# Patient Record
Sex: MALE | Employment: OTHER | URBAN - METROPOLITAN AREA
[De-identification: names, ages, dates, MRNs, and addresses within clinical notes are randomized per-mention and may not be internally consistent; named-entity substitution may affect disease eponyms.]

---

## 2018-09-10 ENCOUNTER — TELEPHONE (OUTPATIENT)
Dept: UROLOGY | Age: 63
End: 2018-09-10

## 2018-09-10 DIAGNOSIS — Z12.5 SCREENING FOR PROSTATE CANCER: Primary | ICD-10-CM

## 2018-11-27 ENCOUNTER — OFFICE VISIT (OUTPATIENT)
Dept: UROLOGY | Age: 63
End: 2018-11-27
Payer: COMMERCIAL

## 2018-11-27 VITALS
DIASTOLIC BLOOD PRESSURE: 86 MMHG | HEIGHT: 68 IN | WEIGHT: 206 LBS | SYSTOLIC BLOOD PRESSURE: 132 MMHG | BODY MASS INDEX: 31.22 KG/M2 | HEART RATE: 76 BPM | TEMPERATURE: 98.6 F

## 2018-11-27 DIAGNOSIS — R35.1 NOCTURIA: ICD-10-CM

## 2018-11-27 DIAGNOSIS — Z12.5 SCREENING FOR PROSTATE CANCER: ICD-10-CM

## 2018-11-27 DIAGNOSIS — N52.9 ERECTILE DYSFUNCTION, UNSPECIFIED ERECTILE DYSFUNCTION TYPE: Primary | ICD-10-CM

## 2018-11-27 DIAGNOSIS — R39.12 WEAK URINARY STREAM: ICD-10-CM

## 2018-11-27 PROCEDURE — 99213 OFFICE O/P EST LOW 20 MIN: CPT | Performed by: NURSE PRACTITIONER

## 2018-11-27 RX ORDER — ALENDRONATE SODIUM 70 MG/1
1 TABLET ORAL WEEKLY
Refills: 3 | COMMUNITY
Start: 2018-11-01

## 2018-11-27 RX ORDER — CHOLECALCIFEROL (VITAMIN D3) 125 MCG
500 CAPSULE ORAL DAILY
COMMUNITY

## 2018-11-27 RX ORDER — CHLORAL HYDRATE 500 MG
2000 CAPSULE ORAL DAILY
COMMUNITY

## 2018-11-27 RX ORDER — SILDENAFIL 100 MG/1
100 TABLET, FILM COATED ORAL PRN
Qty: 30 TABLET | Refills: 11 | Status: SHIPPED | OUTPATIENT
Start: 2018-11-27 | End: 2019-07-11 | Stop reason: SDUPTHER

## 2018-11-27 ASSESSMENT — ENCOUNTER SYMPTOMS
WHEEZING: 0
NAUSEA: 0
EYE REDNESS: 0
SHORTNESS OF BREATH: 0
COUGH: 0
VOMITING: 0
ABDOMINAL PAIN: 0
BACK PAIN: 0
EYE PAIN: 0
COLOR CHANGE: 0

## 2018-11-27 NOTE — LETTER
[facility]  [facility] UROLOGY SPECIALISTS - [place]  [address]  [address]  [address] [facility] [address]  Dept: [phone]  Dept Fax: [phone]        11/27/18    Patient: Silvio Menjivar  YOB: 1955    Dear Rell Burr MD,    I had the pleasure of seeing one of your patients, Gay Carlton today in the office today. Below are the relevant portions of my assessment and plan of care. IMPRESSION:     1. Weak urinary stream    2. Erectile dysfunction, unspecified erectile dysfunction type    3. Nocturia        PLAN:   PSA and EDWARD every 2 years as per  [facility]    Viagra 100 mg as needed. Bladder irritants: coffee, tea, carbonated, alcohol. Call with worsening urinary problems. No Follow-up on file. Prescriptions Ordered:  Orders Placed This Encounter   Medications    sildenafil (VIAGRA) 100 MG tablet     Sig: Take 1 tablet by mouth as needed for Erectile Dysfunction (sex)     Dispense:  30 tablet     Refill:  11     Orders Placed:  No orders of the defined types were placed in this encounter. Thank you for allowing me to participate in the care of this patient. I will keep you updated on this patient's follow up and I look forward to serving you and your patients again in the future.     JENNIFER Melgoza - CNP

## 2018-11-27 NOTE — PATIENT INSTRUCTIONS
PSA and EDWARD every 2 years as per Dr Tapan Dowling    Viagra 100 mg as needed. Bladder irritants: coffee, tea, carbonated, alcohol. Call with worsening urinary problems.

## 2018-11-27 NOTE — PROGRESS NOTES
Review of Systems   Constitutional: Negative for activity change, chills and fever. Eyes: Negative for pain, redness and visual disturbance. Respiratory: Negative for cough, shortness of breath and wheezing. Cardiovascular: Negative for chest pain and leg swelling. Gastrointestinal: Negative for abdominal pain, nausea and vomiting. Genitourinary: Negative for difficulty urinating, discharge, dysuria, flank pain, frequency, hematuria, scrotal swelling and testicular pain. Musculoskeletal: Negative for back pain, joint swelling and myalgias. Skin: Negative for color change and rash. Neurological: Negative for dizziness, tremors and numbness. Hematological: Negative for adenopathy. Does not bruise/bleed easily.
noted  HEENT: Head: Normocephalic and atraumatic  Conjunctivae and EOM are normal. Pupils are equal, round  Nose:Normal  Right External Ear: Normal; Left External Ear: Normal  Mouth: Mucosa Moist  Neck: Supple  Lungs: Respiratory effort is normal  Cardiovascular: Strong and regular, no edema. Abdomen: Soft, non-tender, non-distended  Bladder non-tender and not distended. Musculoskeletal: Normal gait and station  Prostate: enlarged, no nodule or induration. Assessment and Plan      1. Erectile dysfunction, unspecified erectile dysfunction type    2. Weak urinary stream    3. Nocturia    4. Screening for prostate cancer           Plan:    PSA and EDWARD every 2 years as per Dr Idalia Rubio. PSA is 1.20, PSA 2016- 1.4    Viagra 100 mg as needed. Bladder irritants: coffee, tea, carbonated, alcohol. Call with worsening urinary problems. Return in about 2 years (around 11/27/2020) for PSA  and EDWARD 2 yrs. Prescriptions Ordered:  Orders Placed This Encounter   Medications    sildenafil (VIAGRA) 100 MG tablet     Sig: Take 1 tablet by mouth as needed for Erectile Dysfunction (sex)     Dispense:  30 tablet     Refill:  11     Orders Placed:  No orders of the defined types were placed in this encounter. JENNIFER Acevedo CNP    Agree with the ROS entered by the MA.

## 2019-06-04 ENCOUNTER — OFFICE VISIT (OUTPATIENT)
Dept: UROLOGY | Age: 64
End: 2019-06-04
Payer: COMMERCIAL

## 2019-06-04 VITALS
HEART RATE: 91 BPM | TEMPERATURE: 98.3 F | BODY MASS INDEX: 30.49 KG/M2 | HEIGHT: 68 IN | SYSTOLIC BLOOD PRESSURE: 118 MMHG | WEIGHT: 201.2 LBS | DIASTOLIC BLOOD PRESSURE: 72 MMHG

## 2019-06-04 DIAGNOSIS — K62.89 RECTAL PAIN: ICD-10-CM

## 2019-06-04 DIAGNOSIS — R33.9 INCOMPLETE BLADDER EMPTYING: ICD-10-CM

## 2019-06-04 DIAGNOSIS — R35.1 NOCTURIA: ICD-10-CM

## 2019-06-04 DIAGNOSIS — R39.13 INTERMITTENT URINARY STREAM: ICD-10-CM

## 2019-06-04 DIAGNOSIS — R35.0 URINARY FREQUENCY: Primary | ICD-10-CM

## 2019-06-04 DIAGNOSIS — R39.12 WEAK URINARY STREAM: ICD-10-CM

## 2019-06-04 LAB
APPEARANCE FLUID: NORMAL
BILIRUBIN, POC: NORMAL
BLOOD URINE, POC: NORMAL
CLARITY, POC: CLEAR
COLOR, POC: YELLOW
GLUCOSE URINE, POC: NORMAL
KETONES, POC: NORMAL
LEUKOCYTE EST, POC: NORMAL
NITRITE, POC: NORMAL
PH, POC: NORMAL
PROTEIN, POC: NORMAL
SPECIFIC GRAVITY, POC: NORMAL
UROBILINOGEN, POC: NORMAL

## 2019-06-04 PROCEDURE — 81002 URINALYSIS NONAUTO W/O SCOPE: CPT | Performed by: NURSE PRACTITIONER

## 2019-06-04 PROCEDURE — 99214 OFFICE O/P EST MOD 30 MIN: CPT | Performed by: NURSE PRACTITIONER

## 2019-06-04 RX ORDER — TAMSULOSIN HYDROCHLORIDE 0.4 MG/1
0.4 CAPSULE ORAL DAILY
Qty: 30 CAPSULE | Refills: 1 | Status: SHIPPED | OUTPATIENT
Start: 2019-06-04 | End: 2019-07-11

## 2019-06-04 ASSESSMENT — ENCOUNTER SYMPTOMS
BACK PAIN: 0
NAUSEA: 0
EYE PAIN: 0
COUGH: 0
VOMITING: 0
SHORTNESS OF BREATH: 0
ABDOMINAL PAIN: 0
EYE REDNESS: 0
WHEEZING: 0
DIARRHEA: 0
CONSTIPATION: 0

## 2019-06-04 NOTE — PROGRESS NOTES
[facility]  [facility] UROLOGY SPECIALISTS - [place]  [address]  [address]  [address] [facility] [address]  Dept: [address] [facility] Urology Office Note - Established    Patient:  Guerline Ferguson  YOB: 1955  Date: 6/4/2019    The patient is a 58 y.o. male who presents todayfor evaluation of the following problems:   Chief Complaint   Patient presents with    Urinary Frequency     x 1.5 weeks    Groin Pain     x 1.5 weeks        HPI  Here for groin pain and rectal pain,  urinary frequency that started a few days ago. He started an old script of Cipro and has started in 2x per for the last 2 days. He used to have chronic prostatitis and his old Urologist gave his prn Cipro to start. He has noticed with Cipro that his groin pain and rectal pain is improving but continues with frequency, intermittency, incomplete emptying and nocturia. He has had no new partners.     Summary of old records: N/A    Additional History: N/A    Procedures Today: N/A    Urinalysis today:  Results for POC orders placed in visit on 06/04/19   POCT Urine Dipstick   Result Value Ref Range    Color, UA YELLOW     Clarity, UA CLEAR     Glucose, UA POC NEG     Bilirubin, UA      Ketones, UA      Spec Grav, UA      Blood, UA POC TRACE     pH, UA      Protein, UA POC TRACE     Urobilinogen, UA      Leukocytes, UA NEG     Nitrite, UA NEG     Appearance, Fluid  Clear, Slightly Cloudy     Last several PSA's:  No results found for: PSA  Last total testosterone:  No results found for: TESTOSTERONE    AUA Symptom Score (6/4/2019):  INCOMPLETE EMPTYING: How often have you had the sensation of not emptying your bladder?: Less than Half the time  FREQUENCY: How often do you have to urinate less than every two hours?: Not at all  INTERMITTENCY: How often have you found you stopped and started again several times when you urinated?: Not at all  URGENCY: How often have you found it difficult to postpone urination?: Not at all  WEAK STREAM: How often have you had a weak urinary stream?: Not at all  STRAINING: How often have you had to strain to start  urination?: Not at all  NOCTURIA: How many times did you typically get up at night to uriniate?: 2 Times  TOTAL I-PSS SCORE[de-identified] 4  How would you feel if you were to spend the rest of your life with your urinary condition?: Mixe    Last BUN and creatinine:  No results found for: BUN  No results found for: CREATININE    Additional Lab/Culture results:    Imaging Reviewed during this Office Visit: (results were independently reviewed by physician and radiology report verified)    PAST MEDICAL, FAMILY AND SOCIAL HISTORY UPDATE:  Past Medical History:   Diagnosis Date    Polymyalgia rheumatica ([facility] [address]      Past Surgical History:   Procedure Laterality Date    COLONOSCOPY  08/10/2016    HERNIA REPAIR      umbilical     History reviewed. No pertinent family history. Outpatient Medications Marked as Taking for the 6/4/19 encounter (Office Visit) with Clifm Baumgarten, APRN - CNP   Medication Sig Dispense Refill    alendronate (FOSAMAX) 70 MG tablet Take 1 tablet by mouth once a week  3    vitamin B-12 (CYANOCOBALAMIN) 500 MCG tablet Take 500 mcg by mouth daily      vitamin D (CHOLECALCIFEROL) 1000 UNIT TABS tablet Take 2,000 Units by mouth daily      Omega-3 Fatty Acids (FISH OIL) 1000 MG CAPS Take 2,000 mg by mouth daily      sildenafil (VIAGRA) 100 MG tablet Take 1 tablet by mouth as needed for Erectile Dysfunction (sex) 30 tablet 11    DHA-EPA-VITAMIN E PO Take 1 capsule by mouth daily      Melatonin 10 MG CAPS Take 1 capsule by mouth nightly as needed      zinc gluconate 50 MG tablet Take 1 tablet by mouth daily      predniSONE (DELTASONE) 2.5 MG tablet Take 2.5 mg by mouth daily         Patient has no known allergies.   Social History     Tobacco Use   Smoking Status Never Smoker   Smokeless Tobacco Never Used     (Ifpatient a smoker, smoking cessation counseling offered)    Social History

## 2019-06-04 NOTE — PROGRESS NOTES
Review of Systems   Constitutional: Negative for appetite change, chills and fever. Eyes: Negative for pain, redness and visual disturbance. Respiratory: Negative for cough, shortness of breath and wheezing. Cardiovascular: Negative for chest pain and leg swelling. Gastrointestinal: Negative for abdominal pain, constipation, diarrhea, nausea and vomiting. Genitourinary: Positive for frequency and urgency. Negative for difficulty urinating, dysuria, flank pain and hematuria. Musculoskeletal: Negative for back pain, joint swelling and myalgias. Skin: Negative for rash and wound. Neurological: Negative for dizziness, tremors and numbness. Hematological: Does not bruise/bleed easily.

## 2019-07-11 ENCOUNTER — OFFICE VISIT (OUTPATIENT)
Dept: UROLOGY | Age: 64
End: 2019-07-11
Payer: COMMERCIAL

## 2019-07-11 VITALS
DIASTOLIC BLOOD PRESSURE: 83 MMHG | HEIGHT: 68 IN | SYSTOLIC BLOOD PRESSURE: 135 MMHG | TEMPERATURE: 98.1 F | HEART RATE: 89 BPM | BODY MASS INDEX: 30.51 KG/M2 | WEIGHT: 201.28 LBS

## 2019-07-11 DIAGNOSIS — R33.9 INCOMPLETE BLADDER EMPTYING: Primary | ICD-10-CM

## 2019-07-11 DIAGNOSIS — R35.1 NOCTURIA: ICD-10-CM

## 2019-07-11 DIAGNOSIS — N52.9 ERECTILE DYSFUNCTION, UNSPECIFIED ERECTILE DYSFUNCTION TYPE: ICD-10-CM

## 2019-07-11 PROCEDURE — 51798 US URINE CAPACITY MEASURE: CPT | Performed by: NURSE PRACTITIONER

## 2019-07-11 PROCEDURE — 99213 OFFICE O/P EST LOW 20 MIN: CPT | Performed by: NURSE PRACTITIONER

## 2019-07-11 RX ORDER — TAMSULOSIN HYDROCHLORIDE 0.4 MG/1
0.4 CAPSULE ORAL DAILY
Qty: 90 CAPSULE | Refills: 3 | Status: SHIPPED | OUTPATIENT
Start: 2019-07-11 | End: 2020-07-21 | Stop reason: SDUPTHER

## 2019-07-11 RX ORDER — SILDENAFIL 100 MG/1
100 TABLET, FILM COATED ORAL PRN
Qty: 30 TABLET | Refills: 11 | Status: SHIPPED | OUTPATIENT
Start: 2019-07-11 | End: 2020-07-30 | Stop reason: SDUPTHER

## 2019-07-11 ASSESSMENT — ENCOUNTER SYMPTOMS
BACK PAIN: 0
EYE REDNESS: 0
EYE PAIN: 0
SHORTNESS OF BREATH: 0
ABDOMINAL PAIN: 0
DIARRHEA: 0
NAUSEA: 0
COUGH: 0
CONSTIPATION: 0
VOMITING: 0
WHEEZING: 0

## 2019-07-11 NOTE — PROGRESS NOTES
[facility]  [facility] UROLOGY SPECIALISTS - [place]  [address]  [address]  [address] [facility] [address]  Dept: [address] [facility] U[facility] Office Note - Established    Patient:  Violeta Sullivan  YOB: 1955  Date: 7/11/2019    The patient is a 58 y.o. male who presents todayfor evaluation of the following problems:   Chief Complaint   Patient presents with    Urinary Frequency     1 mnth med check        HPI  Here for follow up on Flomax after 1 month use. He now feels he empties well, has less frequency and his nocturia is down to once. No dysuria or hematuria. Summary of old records: N/A    Additional History: N/A    Procedures Today: N/A    Urinalysis today:  No results found for this visit on 07/11/19.   Last several PSA's:  No results found for: PSA  Last total testosterone:  No results found for: TESTOSTERONE    AUA Symptom Score (7/11/2019):  INCOMPLETE EMPTYING: How often have you had the sensation of not emptying your bladder?: Not at all  FREQUENCY: How often do you have to urinate less than every two hours?: Not at all  INTERMITTENCY: How often have you found you stopped and started again several times when you urinated?: Not at all  URGENCY: How often have you found it difficult to postpone urination?: Not at all  WEAK STREAM: How often have you had a weak urinary stream?: Not at all  STRAINING: How often have you had to strain to start  urination?: Not at all  NOCTURIA: How many times did you typically get up at night to uriniate?: 1 Time  TOTAL I-PSS SCORE[de-identified] 1  How would you feel if you were to spend the rest of your life with your urinary condition?: Pleased    Last BUN and creatinine:  No results found for: BUN  No results found for: CREATININE    Additional Lab/Culture results:  Imaging Reviewed during this Office Visit: (results were independently reviewed by physician and radiology report verified)    PAST MEDICAL, [facility]:  Past Medical Follow up 1 yr for EDWARD and PSA    Call with questions or worsening s/s. No follow-ups on file. Prescriptions Ordered:  Orders Placed This Encounter   Medications    tamsulosin (FLOMAX) 0.4 MG capsule     Sig: Take 1 capsule by mouth daily     Dispense:  90 capsule     Refill:  3    sildenafil (VIAGRA) 100 MG tablet     Sig: Take 1 tablet by mouth as needed for Erectile Dysfunction (sex)     Dispense:  30 tablet     Refill:  11     Orders Placed:  Orders Placed This Encounter   Procedures    ND MEASUREMENT,POST-VOID RESIDUAL VOLUME BY US,NON-IMAGING           JENNIFER Triana - CNP    Reviewed and Agree with the ROS entered by the MA.

## 2020-07-21 RX ORDER — TAMSULOSIN HYDROCHLORIDE 0.4 MG/1
0.4 CAPSULE ORAL DAILY
Qty: 30 CAPSULE | Refills: 0 | Status: SHIPPED | OUTPATIENT
Start: 2020-07-21

## 2020-07-30 ENCOUNTER — OFFICE VISIT (OUTPATIENT)
Dept: UROLOGY | Age: 65
End: 2020-07-30
Payer: COMMERCIAL

## 2020-07-30 VITALS — TEMPERATURE: 98.3 F

## 2020-07-30 PROCEDURE — 99214 OFFICE O/P EST MOD 30 MIN: CPT | Performed by: NURSE PRACTITIONER

## 2020-07-30 RX ORDER — TAMSULOSIN HYDROCHLORIDE 0.4 MG/1
0.4 CAPSULE ORAL DAILY
Qty: 90 CAPSULE | Refills: 3 | Status: SHIPPED | OUTPATIENT
Start: 2020-07-30 | End: 2021-05-04 | Stop reason: SDUPTHER

## 2020-07-30 RX ORDER — SILDENAFIL 100 MG/1
100 TABLET, FILM COATED ORAL PRN
Qty: 30 TABLET | Refills: 11 | Status: SHIPPED | OUTPATIENT
Start: 2020-07-30 | End: 2021-05-04 | Stop reason: SDUPTHER

## 2020-07-30 ASSESSMENT — ENCOUNTER SYMPTOMS
EYE REDNESS: 0
SHORTNESS OF BREATH: 0
VOMITING: 0
NAUSEA: 0
BACK PAIN: 0
COLOR CHANGE: 0
COUGH: 0
WHEEZING: 0
ABDOMINAL PAIN: 0
EYE PAIN: 0

## 2020-07-30 NOTE — PROGRESS NOTES
[facility]  [facility] UROLOGY SPECIALISTS - [facility]  [address] [facility] [address]  Dept: [address] [facility] Urology Office Note - Established    Patient:  Bruna Oscar  YOB: 1955  Date: 7/30/2020    The patient is a 71 y.o. male who presents todayfor evaluation of the following problems:   Chief Complaint   Patient presents with    Annual Exam     yearly check with PSA ([facility]) and EDWARD       HPI  Here for 1 year f/u on BPH. He continues Flomax daily, has nocturia x1, has increased urgency with coffee, rare weak stream. No hematuria, no dysuria, no UTI. Has occasional prostate pain- like he is sitting on a stone. Using Viagra 100 mg as needed, has good filling and sustainable. Summary of old records: N/A    Additional History: N/A    Procedures Today: N/A    Urinalysis today:  No results found for this visit on 07/30/20.   Last several PSA's:    PSA 1.36 7/24/2020   Last total testosterone:  No results found for: TESTOSTERONE    AUA Symptom Score (7/30/2020):  INCOMPLETE EMPTYING: How often have you had the sensation of not emptying your bladder?: Not at all  FREQUENCY: How often do you have to urinate less than every two hours?: Less than 1 to 5 times  INTERMITTENCY: How often have you found you stopped and started again several times when you urinated?: Not at all  URGENCY: How often have you found it difficult to postpone urination?: Less than Half the time(depends on fluid intake )  WEAK STREAM: How often have you had a weak urinary stream?: Less than 1 to 5 times  STRAINING: How often have you had to strain to start  urination?: Not at all  NOCTURIA: How many times did you typically get up at night to uriniate?: 1 Time  TOTAL I-PSS SCORE[de-identified] 5  How would you feel if you were to spend the rest of your life with your urinary condition?: Mostly Satisfied    Last BUN and creatinine:  No results found for: BUN  No results found for: CREATININE    Additional Lab/Culture results:     Imaging Reviewed during this Office Visit:   (results were independently reviewed by physician and radiology report verified)    PAST MEDICAL, FAMILY AND SOCIAL HISTORY UPDATE:  Past Medical History:   Diagnosis Date    Polymyalgia rheumatica ([facility] [address]      Past Surgical History:   Procedure Laterality Date    COLONOSCOPY  08/10/2016    HERNIA REPAIR      umbilical     No family history on file. Outpatient Medications Marked as Taking for the 7/30/20 encounter (Office Visit) with JENNIFER Luis - CNP   Medication Sig Dispense Refill    tamsulosin (FLOMAX) 0.4 MG capsule Take 1 capsule by mouth daily 90 capsule 3    sildenafil (VIAGRA) 100 MG tablet Take 1 tablet by mouth as needed for Erectile Dysfunction (sex) 30 tablet 11    tamsulosin (FLOMAX) 0.4 MG capsule Take 1 capsule by mouth daily 30 capsule 0    alendronate (FOSAMAX) 70 MG tablet Take 1 tablet by mouth once a week  3    vitamin B-12 (CYANOCOBALAMIN) 500 MCG tablet Take 500 mcg by mouth daily      vitamin D (CHOLECALCIFEROL) 1000 UNIT TABS tablet Take 2,000 Units by mouth daily      Omega-3 Fatty Acids (FISH OIL) 1000 MG CAPS Take 2,000 mg by mouth daily      DHA-EPA-VITAMIN E PO Take 1 capsule by mouth daily      Melatonin 10 MG CAPS Take 1 capsule by mouth nightly as needed      zinc gluconate 50 MG tablet Take 1 tablet by mouth daily      predniSONE (DELTASONE) 2.5 MG tablet Take 2.5 mg by mouth daily         Patient has no known allergies.   Social History     Tobacco Use   Smoking Status Never Smoker   Smokeless Tobacco Never Used     (Ifpatient a smoker, smoking cessation counseling offered)    Social History     Substance and Sexual Activity   Alcohol Use Yes    Alcohol/week: 0.0 standard drinks    Comment: few drinks on weekends        REVIEW OF SYSTEMS:  Review of Systems    Physical Exam:      Vitals:    07/30/20 1537   Temp: 98.3 °F (36.8 °C)     There is no height or weight on file to calculate BMI.  Patient is a 71 y.o. male in no acute distress and alert and oriented to person, place and time. Physical Exam  Constitutional: Patient in no acute distress. Neuro: Alert and oriented to person, place and time. Psych: Mood normal, affect normal  Skin: warm, pink,  No rash noted  HEENT: Head: Normocephalic andatraumatic  Conjunctivae and EOM are normal. Pupils are equal, round  Nose:Normal  Right External Ear: Normal; Left External Ear: Normal  Mouth: Mucosa Moist  Neck: Supple  Lungs: Respiratory effort is normal  Cardiovascular: strong and reg, no edema   Abdomen: Soft, non-tender, non-distended   Bladder non-tender and not distended. Musculoskeletal: Normal gait and station  Prostate: enlarged, no induration or nodule     Assessment and Plan      1. Hypertrophy of prostate with urinary obstruction    2. Erectile dysfunction, unspecified erectile dysfunction type           Plan:     continue Flomax     Report any blood in the urine    Decrease coffee, beer, tea and carbonated which are bladder irritants. One year PSA   No follow-ups on file. Prescriptions Ordered:  Orders Placed This Encounter   Medications    tamsulosin (FLOMAX) 0.4 MG capsule     Sig: Take 1 capsule by mouth daily     Dispense:  90 capsule     Refill:  3    sildenafil (VIAGRA) 100 MG tablet     Sig: Take 1 tablet by mouth as needed for Erectile Dysfunction (sex)     Dispense:  30 tablet     Refill:  11     Orders Placed:  No orders of the defined types were placed in this encounter. JENNIFER Irene CNP    Reviewed and Agree with the ROS entered by the MA.

## 2020-07-30 NOTE — PATIENT INSTRUCTIONS
continue Flomax      Report any blood in the urine     Decrease coffee, beer, tea and carbonated which are bladder irritants.      One year PSA

## 2020-07-30 NOTE — LETTER
[facility]  [facility] UROLOGY SPECIALISTS - [address]  Dept: [phone]  Dept Fax: [phone]        7/30/20    Patient: Abby Pichardo  YOB: 1955    Dear Alanna Nguyen MD,    I had the pleasure of seeing one of your patients, Velvet Fry today in the office today. Below are the relevant portions of my assessment and plan of care. IMPRESSION:  1. Hypertrophy of prostate with urinary obstruction    2. Erectile dysfunction, unspecified erectile dysfunction type        PLAN:  continue Flomax     Report any blood in the urine    Decrease coffee, beer, tea and carbonated which are bladder irritants. One year PSA   No follow-ups on file. Prescriptions Ordered:  Orders Placed This Encounter   Medications    tamsulosin (FLOMAX) 0.4 MG capsule     Sig: Take 1 capsule by mouth daily     Dispense:  90 capsule     Refill:  3    sildenafil (VIAGRA) 100 MG tablet     Sig: Take 1 tablet by mouth as needed for Erectile Dysfunction (sex)     Dispense:  30 tablet     Refill:  11     Orders Placed:  No orders of the defined types were placed in this encounter. Thank you for allowing me to participate in the care of this patient. I will keep you updated on this patient's follow up and I look forward to serving you and your patients again in the future.     JENNIFER Marin - CNP

## 2021-05-04 ENCOUNTER — OFFICE VISIT (OUTPATIENT)
Dept: UROLOGY | Age: 66
End: 2021-05-04
Payer: COMMERCIAL

## 2021-05-04 VITALS
DIASTOLIC BLOOD PRESSURE: 66 MMHG | SYSTOLIC BLOOD PRESSURE: 96 MMHG | HEART RATE: 83 BPM | WEIGHT: 198 LBS | TEMPERATURE: 97.2 F | HEIGHT: 68 IN | BODY MASS INDEX: 30.01 KG/M2

## 2021-05-04 DIAGNOSIS — R35.1 BENIGN PROSTATIC HYPERPLASIA WITH NOCTURIA: Primary | ICD-10-CM

## 2021-05-04 DIAGNOSIS — N40.1 BENIGN PROSTATIC HYPERPLASIA WITH NOCTURIA: Primary | ICD-10-CM

## 2021-05-04 DIAGNOSIS — N52.9 ERECTILE DYSFUNCTION, UNSPECIFIED ERECTILE DYSFUNCTION TYPE: ICD-10-CM

## 2021-05-04 PROCEDURE — G8417 CALC BMI ABV UP PARAM F/U: HCPCS | Performed by: NURSE PRACTITIONER

## 2021-05-04 PROCEDURE — G8427 DOCREV CUR MEDS BY ELIG CLIN: HCPCS | Performed by: NURSE PRACTITIONER

## 2021-05-04 PROCEDURE — 99213 OFFICE O/P EST LOW 20 MIN: CPT | Performed by: NURSE PRACTITIONER

## 2021-05-04 PROCEDURE — 3017F COLORECTAL CA SCREEN DOC REV: CPT | Performed by: NURSE PRACTITIONER

## 2021-05-04 PROCEDURE — 1036F TOBACCO NON-USER: CPT | Performed by: NURSE PRACTITIONER

## 2021-05-04 PROCEDURE — 1123F ACP DISCUSS/DSCN MKR DOCD: CPT | Performed by: NURSE PRACTITIONER

## 2021-05-04 PROCEDURE — 4040F PNEUMOC VAC/ADMIN/RCVD: CPT | Performed by: NURSE PRACTITIONER

## 2021-05-04 RX ORDER — SILDENAFIL 100 MG/1
100 TABLET, FILM COATED ORAL PRN
Qty: 30 TABLET | Refills: 11 | Status: SHIPPED | OUTPATIENT
Start: 2021-05-04 | End: 2022-05-03 | Stop reason: SDUPTHER

## 2021-05-04 RX ORDER — HYDROXYCHLOROQUINE SULFATE 200 MG/1
TABLET, FILM COATED ORAL
COMMUNITY
Start: 2021-04-21

## 2021-05-04 RX ORDER — FLUTICASONE PROPIONATE 220 UG/1
AEROSOL, METERED RESPIRATORY (INHALATION)
COMMUNITY
Start: 2021-03-15

## 2021-05-04 RX ORDER — TAMSULOSIN HYDROCHLORIDE 0.4 MG/1
0.4 CAPSULE ORAL DAILY
Qty: 90 CAPSULE | Refills: 3 | Status: SHIPPED | OUTPATIENT
Start: 2021-05-04 | End: 2022-05-03 | Stop reason: SDUPTHER

## 2021-05-04 ASSESSMENT — ENCOUNTER SYMPTOMS
SHORTNESS OF BREATH: 0
WHEEZING: 0
DIARRHEA: 0
VOMITING: 0
BACK PAIN: 0
NAUSEA: 0
EYE REDNESS: 0
EYE PAIN: 0
CONSTIPATION: 0
COUGH: 0
ABDOMINAL PAIN: 0

## 2021-05-04 NOTE — PATIENT INSTRUCTIONS
continue Flomax and Viagra reordered to [facility] [address]     decrease fluids 2 hours before bed    EDWARD- no induration or nodule

## 2021-05-04 NOTE — PROGRESS NOTES
[address] [address]  Dept: [address] [facility] Urology Office Note - Established    Patient:  Evangelina Villaseñor  YOB: 1955  Date: 5/4/2021    The patient is a 76 y.o. male who presents todayfor evaluation of the following problems:   Chief Complaint   Patient presents with    Benign Prostatic Hypertrophy     year check up, had PSA done in Care [facility]        HPI  Here for f/u on BPH and prostate exam. He continues Flomax as bedtime, has a good flow, empties well, no hematuria, no dysuria. No Hx of kidney stone, no family Hx of kidney, bladder or prostate     Using Viagra 100 mg for ED with good results. Summary of old records: N/A    Additional History: N/A    Procedures Today: N/A    Urinalysis today:  No results found for this visit on 05/04/21. Last several PSA's:  PSA- 1.90  4/5/2021  PSA- 1.36 7/24/2020  Last total testosterone:  No results found for: TESTOSTERONE    AUA Symptom Score (5/4/2021):   INCOMPLETE EMPTYING: How often have you had the sensation of not emptying your bladder?: Not at all  FREQUENCY: How often do you have to urinate less than every two hours?: Less than 1 to 5 times  INTERMITTENCY: How often have you found you stopped and started again several times when you urinated?: Not at all  URGENCY: How often have you found it difficult to postpone urination?: Not at all  WEAK STREAM: How often have you had a weak urinary stream?: Not at all  STRAINING: How often have you had to strain to start  urination?: Not at all  NOCTURIA: How many times did you typically get up at night to uriniate?: 1 Time  TOTAL I-PSS SCORE[de-identified] 2  How would you feel if you were to spend the rest of your life with your urinary condition?: Pleased    Last BUN and creatinine:  No results found for: BUN  No results found for: CREATININE    Additional Lab/Culture results:     Imaging Reviewed during this Office Visit:   (results were independently reviewed by physician and radiology report verified)    PAST MEDICAL, FAMILY AND SOCIAL HISTORY UPDATE:  Past Medical History:   Diagnosis Date    Polymyalgia rheumatica ([address]      Past Surgical History:   Procedure Laterality Date    COLONOSCOPY  08/10/2016    HERNIA REPAIR      umbilical     History reviewed. No pertinent family history. Outpatient Medications Marked as Taking for the 5/4/21 encounter (Office Visit) with Luis Patches, APRN - CNP   Medication Sig Dispense Refill    hydroxychloroquine (PLAQUENIL) 200 MG tablet TAKE 2 TABLETS BY MOUTH NOW, THEN TAKE 1 TABLET WEEKLY THEREAFTER      FLOVENT  MCG/ACT inhaler       calcium carbonate 1500 (600 Ca) MG TABS tablet Take 600 mg by mouth 2 times daily      tamsulosin (FLOMAX) 0.4 MG capsule Take 1 capsule by mouth daily 90 capsule 3    sildenafil (VIAGRA) 100 MG tablet Take 1 tablet by mouth as needed for Erectile Dysfunction (sex) 30 tablet 11    tamsulosin (FLOMAX) 0.4 MG capsule Take 1 capsule by mouth daily 30 capsule 0    alendronate (FOSAMAX) 70 MG tablet Take 1 tablet by mouth once a week  3    vitamin B-12 (CYANOCOBALAMIN) 500 MCG tablet Take 500 mcg by mouth daily      vitamin D (CHOLECALCIFEROL) 1000 UNIT TABS tablet Take 2,000 Units by mouth daily      Omega-3 Fatty Acids (FISH OIL) 1000 MG CAPS Take 2,000 mg by mouth daily      DHA-EPA-VITAMIN E PO Take 1 capsule by mouth daily      Melatonin 10 MG CAPS Take 1 capsule by mouth nightly as needed      zinc gluconate 50 MG tablet Take 1 tablet by mouth daily      predniSONE (DELTASONE) 2.5 MG tablet Take 2.5 mg by mouth daily         Patient has no known allergies.   Social History     Tobacco Use   Smoking Status Never Smoker   Smokeless Tobacco Never Used     (Ifpatient a smoker, smoking cessation counseling offered)    Social History     Substance and Sexual Activity   Alcohol Use Yes    Alcohol/week: 0.0 standard drinks Comment: few drinks on weekends        REVIEW OF SYSTEMS:  Review of Systems    Physical Exam:      Vitals:    05/04/21 1439   BP: 96/66   Pulse: 83   Temp: 97.2 °F (36.2 °C)     Body mass index is 30.11 kg/m². Patient is a 76 y.o. male in no acute distress and alert and oriented to person, place and time. Physical Exam  Constitutional: Patient in no acute distress. Neuro: Alert and oriented to person, place and time. Psych: Mood normal, affect normal  Skin: warm, pink, No rash noted  HEENT: Head: Normocephalic andatraumatic  Conjunctivae and EOM are normal. Pupils are equal, round  Nose:Normal  Right External Ear: Normal; Left External Ear: Normal  Mouth: Mucosa Moist  Neck: Supple  Lungs: Respiratory effort is normal  Cardiovascular: Warm & Pink,no edema   Abdomen: Soft, non-tender, non-distended  Bladder non-tender and not distended. Musculoskeletal: Normal gait and station  Prostate: no induration or nodule    Assessment and Plan      1. Benign prostatic hyperplasia with nocturia    2. Erectile dysfunction, unspecified erectile dysfunction type           Plan:     continue Flomax and Viagra reordered to [facility] [address]     decrease fluids 2 hours before bed    EDWARD- no induration or nodule   No follow-ups on file. Prescriptions Ordered:  No orders of the defined types were placed in this encounter. Orders Placed:  No orders of the defined types were placed in this encounter. JENNIFER Castillo CNP    reviewed and Agree with the ROS entered by the MA.

## 2021-05-04 NOTE — LETTER
[address] [address]  Dept: [phone]  Dept Fax: [phone]        5/4/21    Patient: Philip Daly  YOB: 1955    Dear Suzette Schulte MD,    I had the pleasure of seeing one of your patients, Debra Merritt today in the office today. Below are the relevant portions of my assessment and plan of care. IMPRESSION:  1. Benign prostatic hyperplasia with nocturia    2. Erectile dysfunction, unspecified erectile dysfunction type        PLAN:  continue Flomax and Viagra reordered to [facility] [address]     decrease fluids 2 hours before bed    EDWARD- no induration or nodule   No follow-ups on file. Prescriptions Ordered:  No orders of the defined types were placed in this encounter. Orders Placed:  No orders of the defined types were placed in this encounter. Thank you for allowing me to participate in the care of this patient. I will keep you updated on this patient's follow up and I look forward to serving you and your patients again in the future.     José Luis Paredes, APRN - CNP

## 2022-05-03 ENCOUNTER — OFFICE VISIT (OUTPATIENT)
Dept: UROLOGY | Age: 67
End: 2022-05-03
Payer: COMMERCIAL

## 2022-05-03 VITALS
HEART RATE: 82 BPM | TEMPERATURE: 97.3 F | WEIGHT: 198 LBS | HEIGHT: 68 IN | DIASTOLIC BLOOD PRESSURE: 82 MMHG | OXYGEN SATURATION: 97 % | SYSTOLIC BLOOD PRESSURE: 136 MMHG | BODY MASS INDEX: 30.01 KG/M2

## 2022-05-03 DIAGNOSIS — R35.1 BENIGN PROSTATIC HYPERPLASIA WITH NOCTURIA: Primary | ICD-10-CM

## 2022-05-03 DIAGNOSIS — Z12.5 PROSTATE CANCER SCREENING: ICD-10-CM

## 2022-05-03 DIAGNOSIS — N40.1 BENIGN PROSTATIC HYPERPLASIA WITH NOCTURIA: Primary | ICD-10-CM

## 2022-05-03 DIAGNOSIS — N52.9 ERECTILE DYSFUNCTION, UNSPECIFIED ERECTILE DYSFUNCTION TYPE: ICD-10-CM

## 2022-05-03 PROCEDURE — 1036F TOBACCO NON-USER: CPT | Performed by: UROLOGY

## 2022-05-03 PROCEDURE — 3017F COLORECTAL CA SCREEN DOC REV: CPT | Performed by: UROLOGY

## 2022-05-03 PROCEDURE — G8417 CALC BMI ABV UP PARAM F/U: HCPCS | Performed by: UROLOGY

## 2022-05-03 PROCEDURE — 4040F PNEUMOC VAC/ADMIN/RCVD: CPT | Performed by: UROLOGY

## 2022-05-03 PROCEDURE — 1123F ACP DISCUSS/DSCN MKR DOCD: CPT | Performed by: UROLOGY

## 2022-05-03 PROCEDURE — 99214 OFFICE O/P EST MOD 30 MIN: CPT | Performed by: UROLOGY

## 2022-05-03 PROCEDURE — G8427 DOCREV CUR MEDS BY ELIG CLIN: HCPCS | Performed by: UROLOGY

## 2022-05-03 RX ORDER — SILDENAFIL 100 MG/1
100 TABLET, FILM COATED ORAL PRN
Qty: 30 TABLET | Refills: 11 | Status: SHIPPED | OUTPATIENT
Start: 2022-05-03

## 2022-05-03 RX ORDER — AZELASTINE HYDROCHLORIDE 0.5 MG/ML
SOLUTION/ DROPS OPHTHALMIC
COMMUNITY
Start: 2022-03-25

## 2022-05-03 RX ORDER — TAMSULOSIN HYDROCHLORIDE 0.4 MG/1
0.4 CAPSULE ORAL DAILY
Qty: 90 CAPSULE | Refills: 3 | Status: SHIPPED | OUTPATIENT
Start: 2022-05-03

## 2022-05-03 ASSESSMENT — ENCOUNTER SYMPTOMS
COUGH: 0
BACK PAIN: 0
CONSTIPATION: 0
WHEEZING: 0
ABDOMINAL PAIN: 0
EYE PAIN: 0
DIARRHEA: 0
VOMITING: 0
EYE REDNESS: 0
SHORTNESS OF BREATH: 0
NAUSEA: 0

## 2022-05-03 NOTE — PROGRESS NOTES
[address] [place] [address]  Dept: [address] [facility] [facility] Urology Office Note - Established    Patient:  Yan White  YOB: 1955  Date: 5/3/2022    The patient is a 78 y.o. male who presents todayfor evaluation of the following problems:   Chief Complaint   Patient presents with    Erectile Dysfunction    Benign Prostatic Hypertrophy     PSA        HPI  He is here in follow up for BPH. He continues on Flomax, which is working well for him. He continues on Viagra as well. Both are working well, and he tolerates them both. PSA is 1.73. AUA SS is 3. Summary of old records: N/A    Additional History: N/A    Procedures Today: N/A    Urinalysis today:  No results found for this visit on 05/03/22. Last several PSA's:  No results found for: PSA  Last total testosterone:  No results found for: TESTOSTERONE    AUA Symptom Score (5/3/2022):   INCOMPLETE EMPTYING: How often have you had the sensation of not emptying your bladder?: Not at all  FREQUENCY: How often do you have to urinate less than every two hours?: Less than 1 to 5 times  INTERMITTENCY: How often have you found you stopped and started again several times when you urinated?: Not at all  URGENCY: How often have you found it difficult to postpone urination?: Not at all  WEAK STREAM: How often have you had a weak urinary stream?: Not at all  STRAINING: How often have you had to strain to start  urination?: Not at all  NOCTURIA: How many times did you typically get up at night to uriniate?: 2 Times  TOTAL I-PSS SCORE[de-identified] 3  How would you feel if you were to spend the rest of your life with your urinary condition?: Pleased    Last BUN and creatinine:  No results found for: BUN  No results found for: CREATININE    Additional Lab/Culture results: none    Imaging Reviewed during this Office Visit: none  (results were independently reviewed by physician and radiology report verified)    PAST MEDICAL, FAMILY AND SOCIAL HISTORY UPDATE:  Past Medical History:   Diagnosis Date    Polymyalgia rheumatica ([address]      Past Surgical History:   Procedure Laterality Date    COLONOSCOPY  08/10/2016    HERNIA REPAIR      umbilical     No family history on file. Outpatient Medications Marked as Taking for the 5/3/22 encounter (Office Visit) with Rich Paula MD   Medication Sig Dispense Refill    azelastine (OPTIVAR) 0.05 % ophthalmic solution INSTILL 1 DROP IN [facility] EYE TWO TIMES A DAY AS NEEDED      sildenafil (VIAGRA) 100 MG tablet Take 1 tablet by mouth as needed for Erectile Dysfunction (sex) 30 tablet 11    tamsulosin (FLOMAX) 0.4 MG capsule Take 1 capsule by mouth daily 90 capsule 3    hydroxychloroquine (PLAQUENIL) 200 MG tablet TAKE 2 TABLETS BY MOUTH NOW, THEN TAKE 1 TABLET WEEKLY THEREAFTER      FLOVENT  MCG/ACT inhaler       calcium carbonate 1500 (600 Ca) MG TABS tablet Take 600 mg by mouth 2 times daily      tamsulosin (FLOMAX) 0.4 MG capsule Take 1 capsule by mouth daily 30 capsule 0    alendronate (FOSAMAX) 70 MG tablet Take 1 tablet by mouth once a week  3    vitamin B-12 (CYANOCOBALAMIN) 500 MCG tablet Take 500 mcg by mouth daily      vitamin D (CHOLECALCIFEROL) 1000 UNIT TABS tablet Take 2,000 Units by mouth daily      Omega-3 Fatty Acids (FISH OIL) 1000 MG CAPS Take 2,000 mg by mouth daily      DHA-EPA-VITAMIN E PO Take 1 capsule by mouth daily      Melatonin 10 MG CAPS Take 1 capsule by mouth nightly as needed      zinc gluconate 50 MG tablet Take 1 tablet by mouth daily      predniSONE (DELTASONE) 2.5 MG tablet Take 2.5 mg by mouth daily         Patient has no known allergies.   Social History     Tobacco Use   Smoking Status Never Smoker   Smokeless Tobacco Never Used     (Ifpatient a smoker, smoking cessation counseling offered)    Social History     Substance and Sexual Activity   Alcohol Use Yes    Alcohol/week: 0.0 standard drinks    Comment: few drinks on weekends        REVIEW OF SYSTEMS:  Review of Systems    Physical Exam:      Vitals:    05/03/22 1343   BP: 136/82   Pulse: 82   Temp: 97.3 °F (36.3 °C)   SpO2: 97%     Body mass index is 30.11 kg/m². Patient is a 78 y.o. male in no acute distress and alert and oriented to person, place and time. Physical Exam  Constitutional: Patient in no acute distress. Neuro: Alert and oriented to person, place and time. Psych: Mood normal, affect normal  Lungs: Respiratory effort is normal  Cardiovascular: Warm & Pink  Abdomen: Soft, non-tender, non-distended with no CVA,  No flank tenderness,  Or hepatosplenomegaly   Lymphatics: No palpablelymphadenopathy. Bladder non-tender and not distended. Musculoskeletal: Normal gait and statiTestiscles: Normal, bilaterally  Prostate:  40 grams, smooth, no nodules. Assessment and Plan      1. Benign prostatic hyperplasia with nocturia    2. Erectile dysfunction, unspecified erectile dysfunction type    3. Prostate cancer screening           Plan:          He is doing well  PSA/EDWARD normal.   Continue Flomax and Viagra. F/U in 1 year. Return in about 1 year (around 5/3/2023) for Labs. Prescriptions Ordered:  Orders Placed This Encounter   Medications    sildenafil (VIAGRA) 100 MG tablet     Sig: Take 1 tablet by mouth as needed for Erectile Dysfunction (sex)     Dispense:  30 tablet     Refill:  11    tamsulosin (FLOMAX) 0.4 MG capsule     Sig: Take 1 capsule by mouth daily     Dispense:  90 capsule     Refill:  3     Orders Placed:  Orders Placed This Encounter   Procedures    PSA, Diagnostic     Standing Status:   Future     Standing Expiration Date:   5/3/2023           Marylen Platts, MD    Agree with the ROS entered by the MA.

## 2023-04-27 DIAGNOSIS — R35.1 BENIGN PROSTATIC HYPERPLASIA WITH NOCTURIA: ICD-10-CM

## 2023-04-27 DIAGNOSIS — N40.1 BENIGN PROSTATIC HYPERPLASIA WITH NOCTURIA: ICD-10-CM

## 2023-04-28 RX ORDER — TAMSULOSIN HYDROCHLORIDE 0.4 MG/1
CAPSULE ORAL
Qty: 90 CAPSULE | Refills: 0 | Status: SHIPPED | OUTPATIENT
Start: 2023-04-28

## 2023-06-26 ENCOUNTER — TELEPHONE (OUTPATIENT)
Dept: UROLOGY | Age: 68
End: 2023-06-26

## 2023-07-31 DIAGNOSIS — N40.1 BENIGN PROSTATIC HYPERPLASIA WITH NOCTURIA: ICD-10-CM

## 2023-07-31 DIAGNOSIS — R35.1 BENIGN PROSTATIC HYPERPLASIA WITH NOCTURIA: ICD-10-CM

## 2023-08-03 RX ORDER — TAMSULOSIN HYDROCHLORIDE 0.4 MG/1
CAPSULE ORAL
Qty: 30 CAPSULE | Refills: 0 | Status: SHIPPED | OUTPATIENT
Start: 2023-08-03 | End: 2023-09-01 | Stop reason: SDUPTHER

## 2023-09-01 ENCOUNTER — OFFICE VISIT (OUTPATIENT)
Dept: UROLOGY | Age: 68
End: 2023-09-01

## 2023-09-01 VITALS
HEIGHT: 68 IN | SYSTOLIC BLOOD PRESSURE: 124 MMHG | WEIGHT: 208 LBS | BODY MASS INDEX: 31.52 KG/M2 | DIASTOLIC BLOOD PRESSURE: 74 MMHG | TEMPERATURE: 97.4 F | HEART RATE: 80 BPM

## 2023-09-01 DIAGNOSIS — N40.1 BENIGN PROSTATIC HYPERPLASIA WITH NOCTURIA: ICD-10-CM

## 2023-09-01 DIAGNOSIS — Z12.5 PROSTATE CANCER SCREENING: Primary | ICD-10-CM

## 2023-09-01 DIAGNOSIS — R35.1 BENIGN PROSTATIC HYPERPLASIA WITH NOCTURIA: ICD-10-CM

## 2023-09-01 DIAGNOSIS — N52.9 ERECTILE DYSFUNCTION, UNSPECIFIED ERECTILE DYSFUNCTION TYPE: ICD-10-CM

## 2023-09-01 RX ORDER — TAMSULOSIN HYDROCHLORIDE 0.4 MG/1
0.4 CAPSULE ORAL DAILY
Qty: 90 CAPSULE | Refills: 3 | Status: SHIPPED | OUTPATIENT
Start: 2023-09-01

## 2023-09-01 RX ORDER — SILDENAFIL 100 MG/1
100 TABLET, FILM COATED ORAL DAILY PRN
Qty: 20 TABLET | Refills: 11 | Status: SHIPPED | OUTPATIENT
Start: 2023-09-01

## 2023-09-01 ASSESSMENT — ENCOUNTER SYMPTOMS
VOMITING: 0
WHEEZING: 0
EYE PAIN: 0
CONSTIPATION: 0
BACK PAIN: 0
RESPIRATORY NEGATIVE: 1
SHORTNESS OF BREATH: 0
GASTROINTESTINAL NEGATIVE: 1
ABDOMINAL PAIN: 0
NAUSEA: 0
EYES NEGATIVE: 1
DIARRHEA: 0
COUGH: 0
EYE REDNESS: 0

## 2023-09-01 NOTE — PROGRESS NOTES
[address]  [address] [facility] [address]  Dept: [address] [place] [address] Urology Office Note - Established    Patient:  Love Montoya  YOB: 1955  Date: 9/1/2023    The patient is a 75 y.o. male who presents todayfor evaluation of the following problems:   Chief Complaint   Patient presents with    Benign Prostatic Hypertrophy     1 year with labs       HPI  Here in follow up for BPH and ED  He is doing well with Viagra and Flomax. He is relatively happy with his voiding. He has nocturia once. Viagra helps with ED. He is tolerating them both just fine. Summary of old records: N/A    Additional History: N/A    Procedures Today: N/A    Urinalysis today:  No results found for this visit on 09/01/23. Last several PSA's:  No results found for: PSA  Last total testosterone:  No results found for: TESTOSTERONE    AUA Symptom Score (9/1/2023): Last BUN and creatinine:  No results found for: BUN  Lab Results   Component Value Date    CREATININE 0.77 07/12/2023       Additional Lab/Culture results: PSA 1.59. Imaging Reviewed during this Office Visit: none  (results were independently reviewed by physician and radiology report verified)    PAST MEDICAL, FAMILY AND SOCIAL HISTORY UPDATE:  Past Medical History:   Diagnosis Date    Polymyalgia rheumatica ([address])      Past Surgical History:   Procedure Laterality Date    COLONOSCOPY  08/10/2016    HERNIA REPAIR      umbilical     No family history on file.   Outpatient Medications Marked as Taking for the 9/1/23 encounter (Office Visit) with Rochelle Villa MD   Medication Sig Dispense Refill    tamsulosin (FLOMAX) 0.4 MG capsule Take 1 capsule by mouth daily 90 capsule 3    sildenafil (VIAGRA) 100 MG tablet Take 1 tablet by mouth daily as needed for Erectile Dysfunction (sex) 20 tablet 11    azelastine (OPTIVAR) 0.05 % ophthalmic

## 2024-06-03 LAB — PROSTATE SPECIFIC ANTIGEN: 2.05 NG/ML

## 2024-08-26 DIAGNOSIS — N40.1 BENIGN PROSTATIC HYPERPLASIA WITH NOCTURIA: ICD-10-CM

## 2024-08-26 DIAGNOSIS — R35.1 BENIGN PROSTATIC HYPERPLASIA WITH NOCTURIA: ICD-10-CM

## 2024-08-28 DIAGNOSIS — Z12.5 PROSTATE CANCER SCREENING: ICD-10-CM

## 2024-08-28 NOTE — TELEPHONE ENCOUNTER
He is doing well with Flomax and Viagra.   PSA is normal.   F/U in 1 year.   Return in about 1 year (around 9/1/2024) for Labs.

## 2024-09-03 ENCOUNTER — OFFICE VISIT (OUTPATIENT)
Dept: UROLOGY | Age: 69
End: 2024-09-03
Payer: COMMERCIAL

## 2024-09-03 VITALS
SYSTOLIC BLOOD PRESSURE: 122 MMHG | WEIGHT: 206 LBS | HEART RATE: 80 BPM | HEIGHT: 68 IN | TEMPERATURE: 98.3 F | BODY MASS INDEX: 31.22 KG/M2 | DIASTOLIC BLOOD PRESSURE: 74 MMHG | OXYGEN SATURATION: 98 %

## 2024-09-03 DIAGNOSIS — Z12.5 PROSTATE CANCER SCREENING: Primary | ICD-10-CM

## 2024-09-03 DIAGNOSIS — R35.1 BENIGN PROSTATIC HYPERPLASIA WITH NOCTURIA: ICD-10-CM

## 2024-09-03 DIAGNOSIS — N52.9 ERECTILE DYSFUNCTION, UNSPECIFIED ERECTILE DYSFUNCTION TYPE: ICD-10-CM

## 2024-09-03 DIAGNOSIS — N40.1 BENIGN PROSTATIC HYPERPLASIA WITH NOCTURIA: ICD-10-CM

## 2024-09-03 PROCEDURE — 1036F TOBACCO NON-USER: CPT | Performed by: UROLOGY

## 2024-09-03 PROCEDURE — 99214 OFFICE O/P EST MOD 30 MIN: CPT | Performed by: UROLOGY

## 2024-09-03 PROCEDURE — G8417 CALC BMI ABV UP PARAM F/U: HCPCS | Performed by: UROLOGY

## 2024-09-03 PROCEDURE — 3017F COLORECTAL CA SCREEN DOC REV: CPT | Performed by: UROLOGY

## 2024-09-03 PROCEDURE — 1123F ACP DISCUSS/DSCN MKR DOCD: CPT | Performed by: UROLOGY

## 2024-09-03 PROCEDURE — G8427 DOCREV CUR MEDS BY ELIG CLIN: HCPCS | Performed by: UROLOGY

## 2024-09-03 RX ORDER — SILDENAFIL 100 MG/1
100 TABLET, FILM COATED ORAL DAILY PRN
Qty: 20 TABLET | Refills: 11 | Status: SHIPPED | OUTPATIENT
Start: 2024-09-03

## 2024-09-03 RX ORDER — TAMSULOSIN HYDROCHLORIDE 0.4 MG/1
0.4 CAPSULE ORAL DAILY
Qty: 90 CAPSULE | Refills: 3 | Status: SHIPPED | OUTPATIENT
Start: 2024-09-03

## 2024-09-03 ASSESSMENT — ENCOUNTER SYMPTOMS
ABDOMINAL PAIN: 0
EYE PAIN: 0
BACK PAIN: 0
NAUSEA: 0
RESPIRATORY NEGATIVE: 1
COUGH: 0
EYES NEGATIVE: 1
COLOR CHANGE: 0
VOMITING: 0
WHEEZING: 0
GASTROINTESTINAL NEGATIVE: 1
SHORTNESS OF BREATH: 0
EYE REDNESS: 0
ALLERGIC/IMMUNOLOGIC NEGATIVE: 1

## 2024-09-03 NOTE — PROGRESS NOTES
[facility]  [address]  [facility] [address]  Dept: [phone]    [facility] Urology Office Note - Established    Patient:  Alex Joseph  YOB: 1955  Date: 9/3/2024    The patient is a 68 y.o. male who presents todayfor evaluation of the following problems:   Chief Complaint   Patient presents with    Prostate cancer screening     1yr psa       HPI  Here for prostate cancer screening.   PSA is 2, it is up from 1.6.   Nocturia is his biggest issue.   He does drink pop before bed.   He is on Flomax and Viagra.   He tolerates both and they work well for him.         Summary of old records: N/A    Additional History: N/A    Procedures Today: N/A    Urinalysis today:  No results found for this visit on 09/03/24.  Last several PSA's:  Lab Results   Component Value Date    PSA 2.05 06/03/2024     Last total testosterone:  No results found for: \"TESTOSTERONE\"    AUA Symptom Score (9/3/2024):                               Last BUN and creatinine:  No results found for: \"BUN\"  Lab Results   Component Value Date    CREATININE 0.79 04/22/2024       Additional Lab/Culture results: none    Imaging Reviewed during this Office Visit: none  (results were independently reviewed by physician and radiology report verified)    PAST MEDICAL, FAMILY AND SOCIAL HISTORY UPDATE:  Past Medical History:   Diagnosis Date    Polymyalgia rheumatica ([facility])      Past Surgical History:   Procedure Laterality Date    COLONOSCOPY  08/10/2016    HERNIA REPAIR      umbilical     No family history on file.  Outpatient Medications Marked as Taking for the 9/3/24 encounter (Office Visit) with Kaveh Hathaway MD   Medication Sig Dispense Refill    sildenafil (VIAGRA) 100 MG tablet Take 1 tablet by mouth daily as needed for Erectile Dysfunction (sex) 20 tablet 11    tamsulosin (FLOMAX) 0.4 MG capsule Take 1 capsule by mouth daily 90 capsule 3

## 2024-09-18 RX ORDER — TAMSULOSIN HYDROCHLORIDE 0.4 MG/1
0.4 CAPSULE ORAL DAILY
Qty: 90 CAPSULE | Refills: 0 | OUTPATIENT
Start: 2024-09-18

## 2025-07-24 DIAGNOSIS — N40.1 BENIGN PROSTATIC HYPERPLASIA WITH NOCTURIA: ICD-10-CM

## 2025-07-24 DIAGNOSIS — R35.1 BENIGN PROSTATIC HYPERPLASIA WITH NOCTURIA: ICD-10-CM

## 2025-07-24 NOTE — TELEPHONE ENCOUNTER
Medication name: tamsulosin (FLOMAX)   Medication dosage: 0.4 mg  Preferred pharmacy: [id number] - [facility] [address] [facility] - P [phone] - F [phone]     Last office visit:9/3/24  Next office (or follow up plan- may copy and paste from last office visit at bottom):  Return in about 1 year (around 9/3/2025) for Labs.   
Patient

## 2025-07-25 RX ORDER — TAMSULOSIN HYDROCHLORIDE 0.4 MG/1
0.4 CAPSULE ORAL DAILY
Qty: 90 CAPSULE | Refills: 0 | Status: SHIPPED | OUTPATIENT
Start: 2025-07-25

## 2025-09-03 ENCOUNTER — HOSPITAL ENCOUNTER (OUTPATIENT)
Age: 70
Setting detail: SPECIMEN
Discharge: HOME | End: 2025-09-03

## 2025-09-03 DIAGNOSIS — Z12.5 PROSTATE CANCER SCREENING: ICD-10-CM

## 2025-09-03 LAB — PSA SERPL-MCNC: 2.62 NG/ML (ref 0–4)
